# Patient Record
Sex: FEMALE | Race: OTHER
[De-identification: names, ages, dates, MRNs, and addresses within clinical notes are randomized per-mention and may not be internally consistent; named-entity substitution may affect disease eponyms.]

---

## 2017-04-08 ENCOUNTER — HOSPITAL ENCOUNTER (EMERGENCY)
Dept: HOSPITAL 72 - EMR | Age: 32
Discharge: HOME | End: 2017-04-08
Payer: COMMERCIAL

## 2017-04-08 VITALS — SYSTOLIC BLOOD PRESSURE: 112 MMHG | DIASTOLIC BLOOD PRESSURE: 62 MMHG

## 2017-04-08 VITALS — SYSTOLIC BLOOD PRESSURE: 110 MMHG | DIASTOLIC BLOOD PRESSURE: 59 MMHG

## 2017-04-08 VITALS — WEIGHT: 160 LBS | HEIGHT: 63 IN | BODY MASS INDEX: 28.35 KG/M2

## 2017-04-08 DIAGNOSIS — N76.0: ICD-10-CM

## 2017-04-08 DIAGNOSIS — B37.3: ICD-10-CM

## 2017-04-08 DIAGNOSIS — N30.00: Primary | ICD-10-CM

## 2017-04-08 DIAGNOSIS — R31.9: ICD-10-CM

## 2017-04-08 LAB
APPEARANCE UR: (no result)
BACTERIA #/AREA URNS HPF: (no result) /HPF
KETONES UR QL STRIP: NEGATIVE
LEUKOCYTE ESTERASE UR QL STRIP: (no result)
MUCOUS THREADS #/AREA URNS LPF: (no result) /LPF
NITRITE UR QL STRIP: NEGATIVE
PH UR STRIP: 6 [PH] (ref 4.5–8)
PROT UR QL STRIP: (no result)
SP GR UR STRIP: 1.02 (ref 1–1.03)
SQUAMOUS #/AREA URNS LPF: (no result) /LPF
UROBILINOGEN UR-MCNC: 8 MG/DL (ref 0–1)
WBC #/AREA URNS HPF: (no result) /HPF (ref 0–2)
YEAST #/AREA URNS HPF: (no result) /HPF

## 2017-04-08 PROCEDURE — 81003 URINALYSIS AUTO W/O SCOPE: CPT

## 2017-04-08 PROCEDURE — 87210 SMEAR WET MOUNT SALINE/INK: CPT

## 2017-04-08 PROCEDURE — 81025 URINE PREGNANCY TEST: CPT

## 2017-04-08 PROCEDURE — 87181 SC STD AGAR DILUTION PER AGT: CPT

## 2017-04-08 PROCEDURE — 99284 EMERGENCY DEPT VISIT MOD MDM: CPT

## 2017-04-08 PROCEDURE — 87491 CHLMYD TRACH DNA AMP PROBE: CPT

## 2017-04-08 PROCEDURE — 87086 URINE CULTURE/COLONY COUNT: CPT

## 2017-04-08 NOTE — EMERGENCY ROOM REPORT
History of Present Illness


General


Chief Complaint:  Abdominal Pain


Source:  Patient





Present Illness


HPI


Is a 31-year-old female with no past medical history.  She presents with chief 

complaint of dysuria and hematuria.  Onset yesterday.  No nausea no vomiting.  

She does have chronic vaginal discharge for many months.  Pap smear was 

unremarkable.  She had urine testing for gonorrhea Chlamydia and blood testing 

for HIV.  This was done at [facility] 2 months ago and was negative.  

She also said that her discharge is limited more.  She essentially active with 

one partner.  No history of STDs.  No history of ectopic pregnancy.  No other 

complaint.


Allergies:  


Coded Allergies:  


     No Known Allergies (Unverified , 8/19/13)





Patient History


Past Medical History:  see triage record, old chart reviewed


Past Surgical History:  other


Pertinent Family History:  none


Social History:  Denies: smoking


Last Menstrual Period:  march 30,2017


Pregnant Now:  No


Immunizations:  other


Reviewed Nursing Documentation:  PMH: Agreed, PSxH: Agreed





Nursing Documentation-PMH


Past Medical History:  No Stated History


Hx Neurological Problems:  Yes - LEFT WRIST PROBLEM LAST YEAR





Review of Systems


Eye:  Denies: blurred vision, eye pain


ENT:  Denies: ear pain, nose congestion, throat swelling


Respiratory:  Denies: cough, shortness of breath


Cardiovascular:  Denies: chest pain, palpitations


Gastrointestinal:  Denies: abdominal pain, diarrhea, nausea, vomiting


Genitourinary:  Reports: discharge, dysuria, hematuria, urgency


Musculoskeletal:  Denies: back pain, joint pain


Skin:  Denies: rash


Neurological:  Denies: headache, numbness


Endocrine:  Denies: increased thirst, increased urine


Hematologic/Lymphatic:  Denies: easy bruising


All Other Systems:  negative except mentioned in HPI





Physical Exam





Vital Signs








  Date Time  Temp Pulse Resp B/P Pulse Ox O2 Delivery O2 Flow Rate FiO2


 


4/8/17 03:59 98.2 73 18 107/60 99 Room Air  





vitals normal


Sp02 EP Interpretation:  reviewed, normal


General Appearance:  well appearing, no apparent distress, alert


Head:  normocephalic, atraumatic


Eyes:  bilateral eye EOMI, bilateral eye PERRL


ENT:  hearing grossly normal, normal pharynx


Neck:  full range of motion, supple, no meningismus


Respiratory:  chest non-tender, lungs clear, normal breath sounds


Cardiovascular #1:  regular rate, rhythm, no murmur


Gastrointestinal:  normal bowel sounds, non tender, no mass, no organomegaly, 

no bruit, non-distended


Genitourinary:  other - Exam done with RN Tiffany Peters as chaperone.  There 

is thick yellowish discharge.  No cervical motion tenderness.  No adnexal mass.


Musculoskeletal:  back normal, gait/station normal, normal range of motion


Neurologic:  alert, oriented x3


Psychiatric:  mood/affect normal


Skin:  warm/dry





Medical Decision Making


Diagnostic Impression:  


 Primary Impression:  


 UTI (urinary tract infection)


 Qualified Codes:  N30.00 - Acute cystitis without hematuria


 Additional Impressions:  


 Bacterial vaginosis


 Vagina, candidiasis


ER Course


Is present with vaginal discharge and urinary complaint.  Diarrhea and 

Chlamydia cultures sent.  No evidence of Trichomonas.  She she's been tested 

several times already, I will hold off treating gonorrhea and Chlamydia and 

tear we have positive culture.  No evidence of ectopic.  No evidence of 

pyelonephritis.





Last Vital Signs








  Date Time  Temp Pulse Resp B/P Pulse Ox O2 Delivery O2 Flow Rate FiO2


 


4/8/17 04:14 98.2 75 15 110/59 99 Room Air  








Status:  improved


Disposition:  HOME, SELF-CARE


Condition:  Stable


Scripts


Fluconazole (FLUCONAZOLE) 150 Mg Tablet


150 MG ORAL ONCE, #1 TAB


   Prov: ADRIANA FAJARDO M.D.         4/8/17 


Metronidazole* (FLAGYL*) 500 Mg Tablet


500 MG ORAL BID, #14 TAB 0 Refills


   Prov: ADRIANA FAJARDO M.D.         4/8/17 


Cephalexin* (KEFLEX*) 500 Mg Capsule


500 MG ORAL TID, #21 CAP 0 Refills


   Prov: ADRIANA FAJARDO M.D.         4/8/17


Referrals:  


[facility] (PCP)





Additional Instructions:  


Followup with your DrGaby in 3-5 days.  Return if symptom worsen.











ADRIANA FAJARDO M.D. Apr 8, 2017 05:07

## 2017-12-03 ENCOUNTER — HOSPITAL ENCOUNTER (EMERGENCY)
Dept: HOSPITAL 72 - EMR | Age: 32
Discharge: HOME | End: 2017-12-03
Payer: MEDICAID

## 2017-12-03 VITALS — SYSTOLIC BLOOD PRESSURE: 106 MMHG | DIASTOLIC BLOOD PRESSURE: 67 MMHG

## 2017-12-03 VITALS — HEIGHT: 63 IN | BODY MASS INDEX: 29.23 KG/M2 | WEIGHT: 165 LBS

## 2017-12-03 DIAGNOSIS — S39.011A: Primary | ICD-10-CM

## 2017-12-03 DIAGNOSIS — N93.9: ICD-10-CM
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
## 2017-12-03 DIAGNOSIS — N39.0: ICD-10-CM
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
## 2017-12-03 LAB
ALBUMIN/GLOB SERPL: 1 {RATIO} (ref 1–2.7)
ALT SERPL-CCNC: 34 U/L (ref 12–78)
ANION GAP SERPL CALC-SCNC: 7 MMOL/L (ref 5–15)
APPEARANCE UR: (no result)
APTT BLD: 26 SEC (ref 23–33)
AST SERPL-CCNC: 17 U/L (ref 15–37)
BACTERIA #/AREA URNS HPF: (no result) /HPF
BASOPHILS NFR BLD AUTO: 0.7 % (ref 0–2)
CALCIUM SERPL-MCNC: 8.9 MG/DL (ref 8.5–10.1)
CHLORIDE SERPL-SCNC: 104 MMOL/L (ref 98–107)
CO2 SERPL-SCNC: 29 MMOL/L (ref 21–32)
CREAT SERPL-MCNC: 0.9 MG/DL (ref 0.55–1.3)
EOSINOPHIL NFR BLD AUTO: 2.1 % (ref 0–3)
ERYTHROCYTE [DISTWIDTH] IN BLOOD BY AUTOMATED COUNT: 11 % (ref 11.6–14.8)
GFR SERPLBLD BASED ON 1.73 SQ M-ARVRAT: > 60 ML/MIN (ref 60–?)
GLOBULIN SER-MCNC: 3.9 G/DL
INR PPP: 0.9 (ref 0.9–1.1)
KETONES UR QL STRIP: (no result)
LEUKOCYTE ESTERASE UR QL STRIP: (no result)
LIPASE SERPL-CCNC: 113 U/L (ref 73–393)
LYMPHOCYTES NFR BLD AUTO: 24.6 % (ref 20–45)
MCH RBC QN AUTO: 31.5 PG (ref 27–31)
MCHC RBC AUTO-ENTMCNC: 33 G/DL (ref 32–36)
MCV RBC AUTO: 95 FL (ref 80–99)
MONOCYTES NFR BLD AUTO: 6.4 % (ref 1–10)
NEUTROPHILS NFR BLD AUTO: 66.2 % (ref 45–75)
NITRITE UR QL STRIP: NEGATIVE
PH UR STRIP: 5 [PH] (ref 4.5–8)
PLATELET # BLD: 300 K/UL (ref 150–450)
PMV BLD AUTO: 8.8 FL (ref 6.5–10.1)
POTASSIUM SERPL-SCNC: 3.8 MMOL/L (ref 3.5–5.1)
PROT SERPL-MCNC: 7.7 G/DL (ref 6.4–8.2)
PROT UR QL STRIP: (no result)
PROTHROMBIN TIME: 9.8 SEC (ref 9.3–11.5)
RBC # BLD AUTO: 4.43 M/UL (ref 4.2–5.4)
RBC #/AREA URNS HPF: (no result) /HPF (ref 0–2)
SODIUM SERPL-SCNC: 140 MMOL/L (ref 136–145)
SP GR UR STRIP: 1.02 (ref 1–1.03)
SQUAMOUS #/AREA URNS LPF: (no result) /LPF
UROBILINOGEN UR-MCNC: 4 MG/DL (ref 0–1)
WBC # BLD AUTO: 12.6 K/UL (ref 4.8–10.8)
WBC #/AREA URNS HPF: (no result) /HPF (ref 0–2)

## 2017-12-03 PROCEDURE — 85730 THROMBOPLASTIN TIME PARTIAL: CPT

## 2017-12-03 PROCEDURE — 83690 ASSAY OF LIPASE: CPT

## 2017-12-03 PROCEDURE — 36415 COLL VENOUS BLD VENIPUNCTURE: CPT

## 2017-12-03 PROCEDURE — 80053 COMPREHEN METABOLIC PANEL: CPT

## 2017-12-03 PROCEDURE — 76830 TRANSVAGINAL US NON-OB: CPT

## 2017-12-03 PROCEDURE — 85610 PROTHROMBIN TIME: CPT

## 2017-12-03 PROCEDURE — 99284 EMERGENCY DEPT VISIT MOD MDM: CPT

## 2017-12-03 PROCEDURE — 85025 COMPLETE CBC W/AUTO DIFF WBC: CPT

## 2017-12-03 PROCEDURE — 81003 URINALYSIS AUTO W/O SCOPE: CPT

## 2017-12-03 PROCEDURE — 81025 URINE PREGNANCY TEST: CPT

## 2017-12-03 PROCEDURE — 76856 US EXAM PELVIC COMPLETE: CPT
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
## 2017-12-04 NOTE — DIAGNOSTIC IMAGING REPORT
Indication: Vaginal bleeding



Technique: Transvaginal images only. Transabdominal images not obtained, reason 

not

stated



Comparison: 7/8/2016



Findings: Nabothian cysts are seen in the cervix. The uterus measures 8.3 cm in

length by 4.2 cm AP. Endometrium measures 9 mm thick. No myometrial abnormality.

Right ovary measures 2.1 cm in length. It demonstrates a 14 mm follicle with

hemorrhage. The left ovary cannot be visualized. No free cul-de-sac fluid



Impression: Somewhat limited exam, due to absence of transabdominal images



Essentially unremarkable.



Incidental finding of cervical nabothian cysts. Note inability to visualize the 

left

ovary

## 2017-12-21 ENCOUNTER — HOSPITAL ENCOUNTER (EMERGENCY)
Dept: HOSPITAL 72 - EMR | Age: 32
Discharge: HOME | End: 2017-12-21
Payer: MEDICAID

## 2017-12-21 VITALS — WEIGHT: 165 LBS | HEIGHT: 62 IN | BODY MASS INDEX: 30.36 KG/M2

## 2017-12-21 VITALS — SYSTOLIC BLOOD PRESSURE: 118 MMHG | DIASTOLIC BLOOD PRESSURE: 76 MMHG

## 2017-12-21 DIAGNOSIS — J06.9: Primary | ICD-10-CM

## 2017-12-21 LAB
APPEARANCE UR: CLEAR
BACTERIA #/AREA URNS HPF: (no result) /HPF
KETONES UR QL STRIP: NEGATIVE
LEUKOCYTE ESTERASE UR QL STRIP: (no result)
MUCOUS THREADS #/AREA URNS LPF: (no result) /LPF
NITRITE UR QL STRIP: NEGATIVE
PH UR STRIP: 6.5 [PH] (ref 4.5–8)
PROT UR QL STRIP: NEGATIVE
RBC #/AREA URNS HPF: (no result) /HPF (ref 0–2)
SP GR UR STRIP: 1.01 (ref 1–1.03)
SQUAMOUS #/AREA URNS LPF: (no result) /LPF
UROBILINOGEN UR-MCNC: NORMAL MG/DL (ref 0–1)
WBC #/AREA URNS HPF: (no result) /HPF (ref 0–2)

## 2017-12-21 PROCEDURE — 99283 EMERGENCY DEPT VISIT LOW MDM: CPT

## 2017-12-21 PROCEDURE — 86710 INFLUENZA VIRUS ANTIBODY: CPT

## 2017-12-21 PROCEDURE — 94640 AIRWAY INHALATION TREATMENT: CPT

## 2017-12-21 PROCEDURE — 94664 DEMO&/EVAL PT USE INHALER: CPT

## 2017-12-21 PROCEDURE — 71020: CPT

## 2017-12-21 PROCEDURE — 81003 URINALYSIS AUTO W/O SCOPE: CPT

## 2017-12-21 NOTE — EMERGENCY ROOM REPORT
History of Present Illness


General


Chief Complaint:  Flu Like Symptoms


Source:  Patient





Present Illness


HPI


Patient complains of cough for the past 5 days.  She states that the cough is 

pretty severe 5 days ago but it has improved significantly over that time.  

Initially she denies sputum production but no longer has any sputum production.

  She denies nasal congestion or fullness.  She denies headache or neck pain.  

She denies chest pain.  She denies abdominal pain.  She denies fever or chills.

  She has no other complaints.


Allergies:  


Coded Allergies:  


     No Known Allergies (Unverified , 8/19/13)





Patient History


Past Medical History:  none


Past Surgical History:  none


Pertinent Family History:  none


Social History:  Denies: smoking, alcohol use, drug use


Reviewed Nursing Documentation:  PMH: Agreed, PSxH: Agreed





Nursing Documentation-PMH


Hx Neurological Problems:  Yes - LEFT WRIST PROBLEM LAST YEAR





Review of Systems


All Other Systems:  negative except mentioned in HPI





Physical Exam





Vital Signs








  Date Time  Temp Pulse Resp B/P (MAP) Pulse Ox O2 Delivery O2 Flow Rate FiO2


 


12/21/17 09:58 98.1 76 20 101/63 99 Room Air  


 


12/21/17 11:07        21








Sp02 EP Interpretation:  reviewed, normal


General Appearance:  no apparent distress, alert, GCS 15, non-toxic


Head:  normocephalic, atraumatic


Eyes:  bilateral eye normal inspection, bilateral eye PERRL


ENT:  hearing grossly normal, normal pharynx, no angioedema, normal voice


Neck:  full range of motion, supple/symm/no masses


Respiratory:  chest non-tender, lungs clear, normal breath sounds, speaking 

full sentences


Cardiovascular #1:  regular rate, rhythm, no edema


Rectal:  deferred


Musculoskeletal:  back normal, gait/station normal, normal range of motion, non-

tender


Neurologic:  alert, oriented x3, responsive, motor strength/tone normal, 

sensory intact, speech normal


Psychiatric:  judgement/insight normal, memory normal, mood/affect normal, no 

suicidal/homicidal ideation


Skin:  normal color, no rash, warm/dry, well hydrated





Medical Decision Making


Diagnostic Impression:  


 Primary Impression:  


 Upper respiratory infection


ER Course


This patient has a clinical presentation with upper respiratory tract 

infection.  The evaluation was very reassuring with a normal lung exam, no 

respiratory distress, normal pulse oximetry.  Chest x-ray is unremarkable.  I 

am not concerned for pneumonia in this patient.  This is most likely viral and 

will not need antibiotic therapy.  I will treat supportively with cough 

suppressants.  No emergency medical condition was identified.





Laboratory Tests








Test


  12/21/17


10:20


 


Urine Color Yellow  


 


Urine Appearance Clear  


 


Urine pH 6.5 (4.5-8.0)  


 


Urine Specific Gravity


  1.015


(1.005-1.035)


 


Urine Protein


  Negative


(NEGATIVE)


 


Urine Glucose (UA)


  Negative


(NEGATIVE)


 


Urine Ketones


  Negative


(NEGATIVE)


 


Urine Occult Blood


  3+ (NEGATIVE)


H


 


Urine Nitrite


  Negative


(NEGATIVE)


 


Urine Bilirubin


  Negative


(NEGATIVE)


 


Urine Urobilinogen


  Normal MG/DL


(0.0-1.0)


 


Urine Leukocyte Esterase


  3+ (NEGATIVE)


H


 


Urine RBC


  2-4 /HPF (0 -


2)  H


 


Urine WBC


  5-10 /HPF (0 -


2)  H


 


Urine Squamous Epithelial


Cells Few /LPF


(NONE/OCC)


 


Urine Bacteria


  Few /HPF


(NONE)


 


Urine Mucus


  Few /LPF


(NONE/OCC)  H








Microbiology








 Date/Time


Source Procedure


Growth Status


 


 


 12/21/17 10:20


Nasal Nares Influenza Types A,B Antigen (JABIER) - Final Complete








Chest X-Ray Diagnostic Results


Chest X-Ray Diagnostic Results :  


   Chest X-Ray Ordered:  Yes


   # of Views/Limited/Complete:  1 View


   Indication:  Other - cough


   EP Interpretation:  Yes


   Interpretation:  no consolidation, no effusion, no pneumothorax, no acute 

cardiopulmonary disease


   Impression:  No acute disease


   Electronically Signed by:  Janie





Last Vital Signs








  Date Time  Temp Pulse Resp B/P (MAP) Pulse Ox O2 Delivery O2 Flow Rate FiO2


 


12/21/17 11:07        21


 


12/21/17 11:07  75 20   Room Air  


 


12/21/17 09:58 98.1   101/63 99   








Status:  improved


Disposition:  HOME, SELF-CARE


Condition:  Improved


Referrals:  


[facility]ERR[facility] (PCP)











LEO CACERES D.O. Dec 21, 2017 11:30

## 2017-12-21 NOTE — DIAGNOSTIC IMAGING REPORT
Indication: Reason For Exam: COUGH

 

Technique: 2 views of the chest

 

Comparison: 5/4/2016.

 

Findings: Lungs and pleural spaces are clear. Heart size is normal. Bones are

unremarkable..

 

Impression: No acute process

## 2018-07-09 ENCOUNTER — HOSPITAL ENCOUNTER (EMERGENCY)
Dept: HOSPITAL 72 - EMR | Age: 33
Discharge: HOME | End: 2018-07-09
Payer: MEDICAID

## 2018-07-09 VITALS — BODY MASS INDEX: 27.6 KG/M2 | HEIGHT: 62 IN | WEIGHT: 150 LBS

## 2018-07-09 VITALS — SYSTOLIC BLOOD PRESSURE: 106 MMHG | DIASTOLIC BLOOD PRESSURE: 53 MMHG

## 2018-07-09 VITALS — SYSTOLIC BLOOD PRESSURE: 110 MMHG | DIASTOLIC BLOOD PRESSURE: 72 MMHG

## 2018-07-09 DIAGNOSIS — N39.0: Primary | ICD-10-CM

## 2018-07-09 LAB
ADD MANUAL DIFF: NO
ALBUMIN SERPL-MCNC: 3.7 G/DL (ref 3.4–5)
ALBUMIN/GLOB SERPL: 1.1 {RATIO} (ref 1–2.7)
ALP SERPL-CCNC: 59 U/L (ref 46–116)
ALT SERPL-CCNC: 25 U/L (ref 12–78)
ANION GAP SERPL CALC-SCNC: 7 MMOL/L (ref 5–15)
APPEARANCE UR: (no result)
APTT PPP: (no result) S
AST SERPL-CCNC: 13 U/L (ref 15–37)
BASOPHILS NFR BLD AUTO: 0.9 % (ref 0–2)
BILIRUB SERPL-MCNC: 0.4 MG/DL (ref 0.2–1)
BUN SERPL-MCNC: 13 MG/DL (ref 7–18)
CALCIUM SERPL-MCNC: 8.8 MG/DL (ref 8.5–10.1)
CHLORIDE SERPL-SCNC: 106 MMOL/L (ref 98–107)
CO2 SERPL-SCNC: 28 MMOL/L (ref 21–32)
CREAT SERPL-MCNC: 0.8 MG/DL (ref 0.55–1.3)
EOSINOPHIL NFR BLD AUTO: 2.6 % (ref 0–3)
ERYTHROCYTE [DISTWIDTH] IN BLOOD BY AUTOMATED COUNT: 11.1 % (ref 11.6–14.8)
GLOBULIN SER-MCNC: 3.5 G/DL
GLUCOSE UR STRIP-MCNC: NEGATIVE MG/DL
HCT VFR BLD CALC: 37.8 % (ref 37–47)
HGB BLD-MCNC: 12.7 G/DL (ref 12–16)
KETONES UR QL STRIP: NEGATIVE
LEUKOCYTE ESTERASE UR QL STRIP: (no result)
LYMPHOCYTES NFR BLD AUTO: 30.8 % (ref 20–45)
MCV RBC AUTO: 94 FL (ref 80–99)
MONOCYTES NFR BLD AUTO: 6.9 % (ref 1–10)
NEUTROPHILS NFR BLD AUTO: 58.8 % (ref 45–75)
NITRITE UR QL STRIP: NEGATIVE
PH UR STRIP: 7 [PH] (ref 4.5–8)
PLATELET # BLD: 232 K/UL (ref 150–450)
POTASSIUM SERPL-SCNC: 3.6 MMOL/L (ref 3.5–5.1)
PROT UR QL STRIP: (no result)
RBC # BLD AUTO: 4.01 M/UL (ref 4.2–5.4)
SODIUM SERPL-SCNC: 141 MMOL/L (ref 136–145)
SP GR UR STRIP: 1.01 (ref 1–1.03)
UROBILINOGEN UR-MCNC: 4 MG/DL (ref 0–1)
WBC # BLD AUTO: 9.9 K/UL (ref 4.8–10.8)

## 2018-07-09 PROCEDURE — 84702 CHORIONIC GONADOTROPIN TEST: CPT

## 2018-07-09 PROCEDURE — 87086 URINE CULTURE/COLONY COUNT: CPT

## 2018-07-09 PROCEDURE — 99283 EMERGENCY DEPT VISIT LOW MDM: CPT

## 2018-07-09 PROCEDURE — 81003 URINALYSIS AUTO W/O SCOPE: CPT

## 2018-07-09 PROCEDURE — 87181 SC STD AGAR DILUTION PER AGT: CPT

## 2018-07-09 PROCEDURE — 36415 COLL VENOUS BLD VENIPUNCTURE: CPT

## 2018-07-09 PROCEDURE — 83690 ASSAY OF LIPASE: CPT

## 2018-07-09 PROCEDURE — 80053 COMPREHEN METABOLIC PANEL: CPT

## 2018-07-09 PROCEDURE — 81025 URINE PREGNANCY TEST: CPT

## 2018-07-09 PROCEDURE — 85025 COMPLETE CBC W/AUTO DIFF WBC: CPT

## 2018-07-09 NOTE — EMERGENCY ROOM REPORT
History of Present Illness


General


Chief Complaint:  Abdominal Pain


Source:  Patient





Present Illness


HPI


Pt sent from PCP office for elevated BP and concern for pregnanacy. Pt 

complains of lower abd pain, no vaginal d/c or dysuria. Pt states home BP was 

174 systolic. Pt is [id number] .


Allergies:  


Coded Allergies:  


     No Known Allergies (Unverified , 13)





Patient History


Past Medical History:  none


Past Surgical History:  none


Pertinent Family History:  none


Social History:  Denies: smoking, alcohol use, drug use


Last Menstrual Period:  18


:  2


Para:  2





Nursing Documentation-[facility]


Past Medical History:  No Stated History


Hx Neurological Problems:  Yes - LEFT WRIST PROBLEM LAST YEAR





Review of Systems


Constitutional:  Denies: no symptoms, see HPI, chills, sweats, fever, malaise, 

weakness, other


Eye:  Denies: no symptoms, see HPI, eye pain, blurred vision, tearing, double 

vision, nose pain, nose congestion, acuity changes, discharge, other


ENT:  Denies: no symptoms, see HPI, ear pain, ear discharge, nose pain, nose 

congestion, throat pain, throat swelling, mouth pain, hearing loss, nasal 

discharge, other


Respiratory:  Denies: no symptoms, see HPI, cough, orthopnea, shortness of 

breath, stridor, wheezing, PARSON, sputum, other


Cardiovascular:  Denies: no symptoms, see HPI, chest pain, edema, palpitations, 

syncope, PND, other


Gastrointestinal:  Denies: no symptoms, see HPI, abdominal pain, constipation, 

diarrhea, nausea, vomiting, melena, hematemesis, other


Genitourinary:  Reports: see HPI


Musculoskeletal:  Denies: no symptoms, see HPI, back pain, gout, joint pain, 

joint swelling, muscle pain, muscle stiffness, other


Skin:  Denies: no symptoms, see HPI, rash, change in color, change in hair/nails

, dryness, lesions, other


Psychiatric:  Denies: no symptoms, see HPI, prior hx, anxiety, depressed 

feelings, emotional problems, SI, HI, hallucinations, other


Neurological:  Denies: no symptoms, see HPI, headache, numbness, paresthesia, 

seizure, tingling, tremors, focal weakness, syncope, dizziness, other


Hematologic/Lymphatic:  Denies: no symptoms, see HPI, anemia, blood clots, easy 

bleeding, easy bruising, swollen glands, diathesis, other





Physical Exam





Vital Signs








  Date Time  Temp Pulse Resp B/P (MAP) Pulse Ox O2 Delivery O2 Flow Rate FiO2


 


18 14:08 98.3 82 18 106/53 97 Room Air  





 98.2       








Sp02 EP Interpretation:  reviewed, normal


General Appearance:  no apparent distress, alert, GCS 15, non-toxic


Head:  normocephalic, atraumatic


Eyes:  bilateral eye normal inspection, bilateral eye PERRL


ENT:  hearing grossly normal, normal pharynx, no angioedema, normal voice


Neck:  full range of motion, supple/symm/no masses


Respiratory:  chest non-tender, lungs clear, normal breath sounds, speaking 

full sentences


Cardiovascular #1:  regular rate, rhythm, no edema


Cardiovascular #2:  2+ carotid (R), 2+ carotid (L), 2+ radial (R), 2+ radial (L)

, 2+ dorsalis pedis (R), 2+ dorsalis pedis (L)


Gastrointestinal:  normal bowel sounds, non tender, soft, non-distended, no 

guarding, no rebound


Musculoskeletal:  back normal, gait/station normal, normal range of motion, non-

tender, calf tenderness


Neurologic:  alert, oriented x3, responsive, motor strength/tone normal, 

sensory intact, speech normal


Psychiatric:  judgement/insight normal, memory normal, mood/affect normal, no 

suicidal/homicidal ideation


Reflexes:  3+ bicep (R), 3+ bicep (L), 3+ tricep (R), 3+ tricep (L), 3+ knee (R)

, 3+ knee (L)


Skin:  normal color, no rash, warm/dry, well hydrated





Medical Decision Making


Diagnostic Impression:  


 Primary Impression:  


 UTI (urinary tract infection)


ER Course


Pt with lower abd pain and concern for preganancy with HTN. Pts BP has been 

normal here. Labs show ?UTI with dirty sample but will start antibiotics given 

symptoms and pt will call in 48 hours fro culture results.Pt agrees with D/C 

home and plan.





Laboratory Tests








Test


  18


14:40


 


White Blood Count


  9.9 K/UL


(4.8-10.8)


 


Red Blood Count


  4.01 M/UL


(4.20-5.40)  L


 


Hemoglobin


  12.7 G/DL


(12.0-16.0)


 


Hematocrit


  37.8 %


(37.0-47.0)


 


Mean Corpuscular Volume 94 FL (80-99)  


 


Mean Corpuscular Hemoglobin


  31.5 PG


(27.0-31.0)  H


 


Mean Corpuscular Hemoglobin


Concent 33.5 G/DL


(32.0-36.0)


 


Red Cell Distribution Width


  11.1 %


(11.6-14.8)  L


 


Platelet Count


  232 K/UL


(150-450)


 


Mean Platelet Volume


  9.0 FL


(6.5-10.1)


 


Neutrophils (%) (Auto)


  58.8 %


(45.0-75.0)


 


Lymphocytes (%) (Auto)


  30.8 %


(20.0-45.0)


 


Monocytes (%) (Auto)


  6.9 %


(1.0-10.0)


 


Eosinophils (%) (Auto)


  2.6 %


(0.0-3.0)


 


Basophils (%) (Auto)


  0.9 %


(0.0-2.0)


 


Urine Color Yazmin  


 


Urine Appearance


  Slightly


cloudy


 


Urine pH 7 (4.5-8.0)  


 


Urine Specific Gravity


  1.015


(1.005-1.035)


 


Urine Protein


  1+ (NEGATIVE)


H


 


Urine Glucose (UA)


  Negative


(NEGATIVE)


 


Urine Ketones


  Negative


(NEGATIVE)


 


Urine Occult Blood


  2+ (NEGATIVE)


H


 


Urine Nitrite


  Negative


(NEGATIVE)


 


Urine Bilirubin


  Negative


(NEGATIVE)


 


Urine Ictotest Negative  


 


Urine Urobilinogen


  4 MG/DL


(0.0-1.0)  H


 


Urine Leukocyte Esterase


  2+ (NEGATIVE)


H


 


Urine RBC


  60-80 /HPF (0


- 2)  H


 


Urine WBC


  10-15 /HPF (0


- 2)  H


 


Urine Squamous Epithelial


Cells Moderate /LPF


(NONE/OCC)  H


 


Urine Bacteria


  Moderate /HPF


(NONE)  H


 


Urine HCG, Qualitative


  Negative


(NEGATIVE)


 


Sodium Level


  141 MMOL/L


(136-145)


 


Potassium Level


  3.6 MMOL/L


(3.5-5.1)


 


Chloride Level


  106 MMOL/L


()


 


Carbon Dioxide Level


  28 MMOL/L


(21-32)


 


Anion Gap


  7 mmol/L


(5-15)


 


Blood Urea Nitrogen


  13 mg/dL


(7-18)


 


Creatinine


  0.8 MG/DL


(0.55-1.30)


 


Estimat Glomerular Filtration


Rate > 60 mL/min


(>60)


 


Glucose Level


  97 MG/DL


()


 


Calcium Level


  8.8 MG/DL


(8.5-10.1)


 


Total Bilirubin


  0.4 MG/DL


(0.2-1.0)


 


Aspartate Amino Transf


(AST/SGOT) 13 U/L (15-37)


L


 


Alanine Aminotransferase


(ALT/SGPT) 25 U/L (12-78)


 


 


Alkaline Phosphatase


  59 U/L


()


 


Total Protein


  7.2 G/DL


(6.4-8.2)


 


Albumin


  3.7 G/DL


(3.4-5.0)


 


Globulin 3.5 g/dL  


 


Albumin/Globulin Ratio 1.1 (1.0-2.7)  


 


Lipase


  109 U/L


()


 


Human Chorionic Gonadotropin,


Quant 1 mIU/mL (1-6)


 











Last Vital Signs








  Date Time  Temp Pulse Resp B/P (MAP) Pulse Ox O2 Delivery O2 Flow Rate FiO2


 


18 14:08 98.3 82 18 106/53 97 Room Air  





 98.2       








Disposition:  HOME, SELF-CARE


Condition:  Stable


Scripts


Cephalexin* (KEFLEX*) 500 Mg Capsule


500 MG ORAL EVERY 6 HOURS for 7 Days, #21 CAP


   Prov: JOSE CRAIG         18











JOSE CRAIG 2018 14:33

## 2018-09-03 ENCOUNTER — HOSPITAL ENCOUNTER (EMERGENCY)
Dept: HOSPITAL 72 - EMR | Age: 33
Discharge: HOME | End: 2018-09-03
Payer: COMMERCIAL

## 2018-09-03 VITALS — SYSTOLIC BLOOD PRESSURE: 97 MMHG | DIASTOLIC BLOOD PRESSURE: 55 MMHG

## 2018-09-03 VITALS — HEIGHT: 62 IN | WEIGHT: 160 LBS | BODY MASS INDEX: 29.44 KG/M2

## 2018-09-03 DIAGNOSIS — M26.602: Primary | ICD-10-CM

## 2018-09-03 PROCEDURE — 99282 EMERGENCY DEPT VISIT SF MDM: CPT

## 2018-09-03 NOTE — EMERGENCY ROOM REPORT
History of Present Illness


General


Chief Complaint:  Pain


Source:  Patient





Present Illness


HPI


Patient presents with complaints of pain to the left facial region


Essentially points to just anterior to the left year and moving upward towards 

the temporal area


Denies any type pain denies any visual changes


Discomfort started on Friday and has persisted





Denies any ear pain denies any fevers or chills


Denies any recent dental work


Pain is worse with touch and movement


Allergies:  


Coded Allergies:  


     No Known Allergies (Unverified , 8/19/13)





Patient History


Past Medical History:  see triage record


Pertinent Family History:  none


Reviewed Nursing Documentation:  PMH: Agreed; PSxH: Agreed





Nursing Documentation-[facility]


Past Medical History:  No Stated History


Hx Neurological Problems:  Yes - LEFT WRIST PROBLEM LAST YEAR





Review of Systems


All Other Systems:  negative except mentioned in HPI





Physical Exam





Vital Signs








  Date Time  Temp Pulse Resp B/P (MAP) Pulse Ox O2 Delivery O2 Flow Rate FiO2


 


9/3/18 06:49 98.1 72 16 97/55 97 Room Air  





 98.1       








Sp02 EP Interpretation:  reviewed, normal


General Appearance:  well appearing, no apparent distress


Head:  normocephalic, atraumatic, other - Some discomfort reproduced on 

palpation of the region just anterior to the tragus on the left side, also 

discomfort just appear to that


Eyes:  bilateral eye PERRL, bilateral eye EOMI


ENT:  normal pharynx, no angioedema, other - Left back molar lower tooth 

appears to be eroding into the gingival area


Neck:  supple, thyroid normal


Respiratory:  lungs clear, normal breath sounds


Cardiovascular #1:  regular rate, rhythm


Gastrointestinal:  non tender, soft


Musculoskeletal:  normal inspection - Patient able to open and close her mouth, 

it does consult some discomfort to the left TMJ region


Neurologic:  alert, oriented x3, CNs III-XII nml as tested


Skin:  no rash, warm/dry


Lymphatic:  no adenopathy





Medical Decision Making


Diagnostic Impression:  


 Primary Impression:  


 TMJ pain dysfunction syndrome


ER Course


Patient's discomfort is palpated at the TMJ site on the left side, there is 

question of some dental pathology as well, no obvious trismus


Patient does not display other signs of temporal arteritis





At this time will have initial conservative outpatient trial and return with 

any changes


Recommended to follow up with primary physician also dentist in the next 2-3 

days





Last Vital Signs








  Date Time  Temp Pulse Resp B/P (MAP) Pulse Ox O2 Delivery O2 Flow Rate FiO2


 


9/3/18 06:58 98.1  16 97/55 97 Room Air  





 98.1       


 


9/3/18 06:49  72      








Status:  unchanged


Disposition:  HOME, SELF-CARE


Condition:  Stable


Referrals:  


NOT CHOSEN IPA/MD,REFERRING (PCP)





Additional Instructions:  


Patient is provided with the discharge instructions notified to follow up with 

primary doctor in the next 2-3 days otherwise return to the er with any 

worsening symptoms.


Please note that this report is being documented using DRAGON technology.  This 

can lead to erroneous entry secondary to incorrect interpretation by the 

dictating instrument.











Ritchie Eldridge DO Sep 3, 2018 07:08

## 2018-11-30 ENCOUNTER — HOSPITAL ENCOUNTER (EMERGENCY)
Dept: HOSPITAL 72 - EMR | Age: 33
Discharge: HOME | End: 2018-11-30
Payer: COMMERCIAL

## 2018-11-30 VITALS — DIASTOLIC BLOOD PRESSURE: 65 MMHG | SYSTOLIC BLOOD PRESSURE: 112 MMHG

## 2018-11-30 VITALS — BODY MASS INDEX: 29.44 KG/M2 | HEIGHT: 62 IN | WEIGHT: 160 LBS

## 2018-11-30 VITALS — SYSTOLIC BLOOD PRESSURE: 112 MMHG | DIASTOLIC BLOOD PRESSURE: 65 MMHG

## 2018-11-30 VITALS — DIASTOLIC BLOOD PRESSURE: 59 MMHG | SYSTOLIC BLOOD PRESSURE: 110 MMHG
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
## 2018-11-30 DIAGNOSIS — S16.1XXA: Primary | ICD-10-CM

## 2018-11-30 PROCEDURE — 96372 THER/PROPH/DIAG INJ SC/IM: CPT

## 2018-11-30 PROCEDURE — 99283 EMERGENCY DEPT VISIT LOW MDM: CPT

## 2018-11-30 NOTE — EMERGENCY ROOM REPORT
History of Present Illness


General


Chief Complaint:  Neck Pain


Source:  Patient





Present Illness


HPI


32-year-old female presents ED for evaluation.  Complaining of neck pain 5 

days.  Started when she woke up on Sunday.  States that she's been having on 

and off neck problems.  States that her pillows are old.  States that she has 

had a previous next spasm in the past.  Pain is sharp, 10 out of 10, 

nonradiating.  Denies headache, photophobia.  Denies blurry vision.  Denies 

nausea or vomiting.  Denies fevers or chills.  Denies recent injury.  No other 

aggravating relieving factors.  Denies any other associated symptoms


Allergies:  


Coded Allergies:  


     No Known Allergies (Unverified , 8/19/13)





Patient History


Past Medical History:  none


Past Surgical History:  none


Pertinent Family History:  none


Social History:  Denies: smoking, alcohol use, drug use


Last Menstrual Period:  11/28/18


Pregnant Now:  No


Immunizations:  UTD


Reviewed Nursing Documentation:  PMH: Agreed; PSxH: Agreed





Nursing Documentation-PMH


Past Medical History:  No Stated History


Hx Neurological Problems:  Yes - LEFT WRIST PROBLEM LAST YEAR





Review of Systems


All Other Systems:  negative except mentioned in HPI





Physical Exam





Vital Signs








  Date Time  Temp Pulse Resp B/P (MAP) Pulse Ox O2 Delivery O2 Flow Rate FiO2


 


11/30/18 05:26 98.2 70 16 102/65 98 Room Air  








Sp02 EP Interpretation:  reviewed, normal


General Appearance:  no apparent distress, alert, GCS 15, non-toxic


Head:  normocephalic


Eyes:  bilateral eye normal inspection, bilateral eye PERRL


ENT:  hearing grossly normal, normal pharynx, no angioedema, normal voice, TMs 

+ canals normal


Neck:  no meningismus, no bony tend, tender lateral


Respiratory:  normal inspection


Cardiovascular #1:  normal inspection


Gastrointestinal:  normal inspection


Rectal:  deferred


Genitourinary:  no CVA tenderness


Musculoskeletal:  normal inspection


Neurologic:  alert, oriented x3, responsive, CNs III-XII nml as tested, motor 

strength/tone normal, sensory intact, speech normal


Psychiatric:  normal inspection


Skin:  normal inspection


Lymphatic:  normal inspection





Medical Decision Making


Diagnostic Impression:  


 Primary Impression:  


 Cervical strain, acute


 Qualified Codes:  S16.1XXA - Strain of muscle, fascia and tendon at neck level

, initial encounter


 Additional Impression:  


 Neck pain


ER Course


Hospital Course 


32-year-old female presents ED complaining of left-sided neck pain x 5 days 


Differential diagnoses include: neck strain, shoulder strain, dislocation/

fracture





Clinical course


Patient placed on stretcher.  After initial history and physical exam reveals a 

female in mild acute distress.  On exam there is no midline neck tenderness or 

shoulder tenderness.  Full range of motion to the shoulder.  There is pain over 

the trapezius.  Consistent with a neck strain/shoulder strain





I ordered Toradol, Norco, Lidoderm patch, Robaxin in ED.  Upon reassessment 

pain is improved





Discussed findings with patient.  We will discharge with similar regimen of 

pain medications.  Safe for discharge close outpatient follow-up





Diagnosis - cervical strain 





Stable and discharged to home with prescription for treatment motrin, robaxin, 

lidoderm, norco.  Followup with PMD.  Return to ED if symptoms recur or worsen





Last Vital Signs








  Date Time  Temp Pulse Resp B/P (MAP) Pulse Ox O2 Delivery O2 Flow Rate FiO2


 


11/30/18 06:59 98.5 72 15 112/65 100 Room Air  








Status:  improved


Disposition:  HOME, SELF-CARE


Condition:  Stable


Scripts


Lidocaine (Lidoderm) 1 Each Adh..patch


1 PATCH TOPIC DAILY, #7 PATCH 0 Refills


   Patch(es) may remain in place for up to 12 hours in any 24-hour


   period.


   Prov: Mann Hernandez MD         11/30/18 


Methocarbamol* (ROBAXIN-750*) 750 Mg Tablet


750 MG PO TID, #21 TAB 0 Refills


   Prov: Mann Hernandez MD         11/30/18 


Hydrocodone Bit/Acetaminophen 5-325* (NORCO 5-325*) 1 Each Tablet


1 TAB ORAL Q6H PRN for For Pain, #10 TAB 0 Refills


   Prov: Mann Hernandez MD         11/30/18 


Ibuprofen* (MOTRIN*) 600 Mg Tablet


600 MG ORAL Q8H PRN for For Pain, #30 TAB 0 Refills


   Prov: Mann Hernandez MD         11/30/18


Departure Forms:  Return to Work      Return to Work Date:  Dec 3, 2018


   Work Restrictions:  No Heavy Lifting


Patient Instructions:  Cervical Strain and Sprain With [facility]











Mann Hernandez MD Nov 30, 2018 22:18

## 2019-02-16 ENCOUNTER — HOSPITAL ENCOUNTER (EMERGENCY)
Dept: HOSPITAL 72 - EMR | Age: 34
Discharge: HOME | End: 2019-02-16
Payer: COMMERCIAL

## 2019-02-16 VITALS — HEIGHT: 62 IN | BODY MASS INDEX: 30.36 KG/M2 | WEIGHT: 165 LBS

## 2019-02-16 VITALS — DIASTOLIC BLOOD PRESSURE: 72 MMHG | SYSTOLIC BLOOD PRESSURE: 110 MMHG

## 2019-02-16 DIAGNOSIS — K52.9: ICD-10-CM

## 2019-02-16 DIAGNOSIS — N88.8: ICD-10-CM

## 2019-02-16 DIAGNOSIS — O26.891: ICD-10-CM

## 2019-02-16 DIAGNOSIS — O20.0: Primary | ICD-10-CM

## 2019-02-16 DIAGNOSIS — Z3A.12: ICD-10-CM

## 2019-02-16 LAB
ADD MANUAL DIFF: NO
ALBUMIN SERPL-MCNC: 3.3 G/DL (ref 3.4–5)
ALBUMIN/GLOB SERPL: 0.8 {RATIO} (ref 1–2.7)
ALP SERPL-CCNC: 63 U/L (ref 46–116)
ALT SERPL-CCNC: 30 U/L (ref 12–78)
ANION GAP SERPL CALC-SCNC: 9 MMOL/L (ref 5–15)
APPEARANCE UR: CLEAR
APTT PPP: YELLOW S
AST SERPL-CCNC: 17 U/L (ref 15–37)
BASOPHILS NFR BLD AUTO: 0.7 % (ref 0–2)
BILIRUB SERPL-MCNC: 0.4 MG/DL (ref 0.2–1)
BUN SERPL-MCNC: 8 MG/DL (ref 7–18)
CALCIUM SERPL-MCNC: 9.1 MG/DL (ref 8.5–10.1)
CHLORIDE SERPL-SCNC: 101 MMOL/L (ref 98–107)
CO2 SERPL-SCNC: 25 MMOL/L (ref 21–32)
CREAT SERPL-MCNC: 0.6 MG/DL (ref 0.55–1.3)
EOSINOPHIL NFR BLD AUTO: 2.5 % (ref 0–3)
ERYTHROCYTE [DISTWIDTH] IN BLOOD BY AUTOMATED COUNT: 11 % (ref 11.6–14.8)
GLOBULIN SER-MCNC: 4.1 G/DL
GLUCOSE UR STRIP-MCNC: NEGATIVE MG/DL
HCT VFR BLD CALC: 39.8 % (ref 37–47)
HGB BLD-MCNC: 13.8 G/DL (ref 12–16)
KETONES UR QL STRIP: (no result)
LEUKOCYTE ESTERASE UR QL STRIP: (no result)
LYMPHOCYTES NFR BLD AUTO: 24.1 % (ref 20–45)
MCV RBC AUTO: 93 FL (ref 80–99)
MONOCYTES NFR BLD AUTO: 3.9 % (ref 1–10)
NEUTROPHILS NFR BLD AUTO: 68.9 % (ref 45–75)
NITRITE UR QL STRIP: NEGATIVE
PH UR STRIP: 6 [PH] (ref 4.5–8)
PLATELET # BLD: 294 K/UL (ref 150–450)
POTASSIUM SERPL-SCNC: 3.6 MMOL/L (ref 3.5–5.1)
PROT UR QL STRIP: (no result)
RBC # BLD AUTO: 4.27 M/UL (ref 4.2–5.4)
SODIUM SERPL-SCNC: 135 MMOL/L (ref 136–145)
SP GR UR STRIP: 1.02 (ref 1–1.03)
UROBILINOGEN UR-MCNC: 4 MG/DL (ref 0–1)
WBC # BLD AUTO: 11.7 K/UL (ref 4.8–10.8)

## 2019-02-16 PROCEDURE — 83690 ASSAY OF LIPASE: CPT

## 2019-02-16 PROCEDURE — 81025 URINE PREGNANCY TEST: CPT

## 2019-02-16 PROCEDURE — 96374 THER/PROPH/DIAG INJ IV PUSH: CPT

## 2019-02-16 PROCEDURE — 81003 URINALYSIS AUTO W/O SCOPE: CPT

## 2019-02-16 PROCEDURE — 99284 EMERGENCY DEPT VISIT MOD MDM: CPT

## 2019-02-16 PROCEDURE — 96361 HYDRATE IV INFUSION ADD-ON: CPT

## 2019-02-16 PROCEDURE — 80053 COMPREHEN METABOLIC PANEL: CPT

## 2019-02-16 PROCEDURE — 76830 TRANSVAGINAL US NON-OB: CPT

## 2019-02-16 PROCEDURE — 36415 COLL VENOUS BLD VENIPUNCTURE: CPT

## 2019-02-16 PROCEDURE — 84702 CHORIONIC GONADOTROPIN TEST: CPT

## 2019-02-16 PROCEDURE — 76801 OB US < 14 WKS SINGLE FETUS: CPT

## 2019-02-16 PROCEDURE — 85025 COMPLETE CBC W/AUTO DIFF WBC: CPT

## 2019-02-16 PROCEDURE — 96375 TX/PRO/DX INJ NEW DRUG ADDON: CPT

## 2019-02-16 NOTE — DIAGNOSTIC IMAGING REPORT
EXAM:

  US Pregnancy First Trimester, Transabdominal

  US Pregnancy, Transvaginal

 

CLINICAL HISTORY:

  ABD PAIN

 

TECHNIQUE:

  Real-time transabdominal and transvaginal obstetrical ultrasound of the 

maternal pelvis and a first trimester pregnancy with image documentation. 

 Transvaginal imaging was used for better evaluation of the fetus and 

adnexa.

 

COMPARISON:

  No relevant prior studies available.

 

FINDINGS:

  Gestation:  Single, live, intrauterine gestation, with estimated 

gestational age of 11 weeks 3 days and LELA of 09/04/19.  Crown-rump 

length of 44.8 mm.  9 x 8 mm fetal yolk sac.  Fetal heart rate of 153 bpm.

 

  Placenta/amniotic fluid:  Cannot be adequately evaluated due to the 

early gestational age.

  Uterus/cervix:  Septated uterus.  Subcentimeter cervical nabothian 

cysts.  Cervical length of 4.3 cm.  No myometrial mass.

  Ovaries:  The ovaries were not identified by transvaginal sonography.

  Free fluid:  No free fluid.

 

IMPRESSION:     

1.  Single, live, intrauterine gestation, with estimated gestational age 

of 11 weeks 3 days and LELA of 09/04/19.

2.  Septated uterus.

3.  Subcentimeter cervical nabothian cysts.

## 2019-02-16 NOTE — NUR
ED Nurse Note:



PT WALKED IN TO ER TODAY FROM HOME. AOX4. PT C/O ABDOMINAL CRAMPING, 8/10 PAIN, 
NAUSEA, VOMITING AND DIARRHEA X 2 DAYS AGO. ACTIVE BOWEL SOUNDS IN ALL 
QUADRANTS. ABDOMEN NONDISTENDED AND NONTENDER TO PALPATION. LAST BM X THIS AM 
WHICH PT STATES WAS LIQUID. OF NOTE, PT HAS BEEN CONFIRMED 12 WEEKS PREGNANT BY 
OB/GYN.

## 2019-02-16 NOTE — EMERGENCY ROOM REPORT
History of Present Illness


General


Chief Complaint:  Pregnancy Complications


Source:  Patient





Present Illness


HPI


33-year-old female presents ED for evaluation.  Present with abdominal pain 

with vomiting and diarrhea 2 days.  Pain is cramping, 8 out of 10, 

nonradiating.  Denies any recent travel or recent antibiotic use.  Denies sick 

contacts.  States that she is about 12 weeks pregnant.  Denies any spotting or 

bleeding.  Denies fevers or chills.  No other aggravating relieving factors.  

Denies any other associated symptoms


Allergies:  


Coded Allergies:  


     No Known Allergies (Unverified , 13)





Patient History


Past Medical History:  none


Past Surgical History:  none


Pertinent Family History:  none


Social History:  Denies: smoking, alcohol use, drug use


Last Menstrual Period:  2018


Pregnant Now:  Yes


:  3


Para:  2


Immunizations:  UTD


Reviewed Nursing Documentation:  PMH: Agreed; PSxH: Agreed





Nursing Documentation-PM


Past Medical History:  No Stated History


Hx Neurological Problems:  Yes - LEFT WRIST PROBLEM LAST YEAR





Review of Systems


All Other Systems:  negative except mentioned in HPI





Physical Exam





Vital Signs








  Date Time  Temp Pulse Resp B/P (MAP) Pulse Ox O2 Delivery O2 Flow Rate FiO2


 


19 08:51 98.2 92 16 105/66 97 Room Air  








Sp02 EP Interpretation:  reviewed, normal


General Appearance:  no apparent distress, alert, GCS 15, non-toxic


Head:  normocephalic, atraumatic


Eyes:  bilateral eye normal inspection, bilateral eye PERRL


ENT:  hearing grossly normal, normal pharynx, no angioedema, normal voice


Neck:  full range of motion, supple/symm/no masses


Respiratory:  chest non-tender, lungs clear, normal breath sounds, speaking 

full sentences


Cardiovascular #1:  regular rate, rhythm, no edema


Cardiovascular #2:  2+ carotid (R), 2+ carotid (L), 2+ radial (R), 2+ radial (L)

, 2+ dorsalis pedis (R), 2+ dorsalis pedis (L)


Gastrointestinal:  normal bowel sounds, non tender, soft, non-distended, no 

guarding, no rebound


Rectal:  deferred


Genitourinary:  normal inspection, no CVA tenderness


Musculoskeletal:  back normal, gait/station normal, normal range of motion, non-

tender


Neurologic:  alert, oriented x3, responsive, motor strength/tone normal, 

sensory intact, speech normal


Psychiatric:  judgement/insight normal, memory normal, mood/affect normal, no 

suicidal/homicidal ideation


Reflexes:  3+ bicep (R), 3+ bicep (L), 3+ tricep (R), 3+ tricep (L), 3+ knee (R)

, 3+ knee (L)


Skin:  normal color, no rash, warm/dry, well hydrated


Lymphatic:  no adenopathy





Medical Decision Making


Diagnostic Impression:  


 Primary Impression:  


 Threatened 


 Additional Impression:  


 Gastroenteritis


ER Course


Hospital Course 


33-year-old female presents to ED complaining of abdominal pain with vomiting 

and dirrhea, + pregnant





Differential diagnoses include: gastrits, gastroenterits, ectopic pregnancy, 

ovarian torsion/cyst, UTI 





Clinical course


Patient placed on stretcher in ED.  After initial history and physical I 

ordered labs, IV fluids and Zofran, pepcid and pelvic ultrasound.





Labs-no leukocytosis, electrolytes okay, UA negative





OB ultrasound-IUP detected with fetal heart rate





Discussed findings with patient.  Likely gastroenteritis.  Still recommended 

patient follow-up with her OB/GYN with serial ultrasounds.





Patient agrees with plan.  Safe for discharge close outpatient follow-up.  

States she has a PMD





Diagnosis - threatened , gastroenteritis





Stable and discharged to home with Rx zofran, zantac, bentyl.  Followup with PMD

/OB/GYN.  Return to ED if symptoms recur or worsen





Labs








Test


  19


09:11


 


White Blood Count


  11.7 K/UL


(4.8-10.8)


 


Red Blood Count


  4.27 M/UL


(4.20-5.40)


 


Hemoglobin


  13.8 G/DL


(12.0-16.0)


 


Hematocrit


  39.8 %


(37.0-47.0)


 


Mean Corpuscular Volume 93 FL (80-99) 


 


Mean Corpuscular Hemoglobin


  32.4 PG


(27.0-31.0)


 


Mean Corpuscular Hemoglobin


Concent 34.8 G/DL


(32.0-36.0)


 


Red Cell Distribution Width


  11.0 %


(11.6-14.8)


 


Platelet Count


  294 K/UL


(150-450)


 


Mean Platelet Volume


  7.3 FL


(6.5-10.1)


 


Neutrophils (%) (Auto)


  68.9 %


(45.0-75.0)


 


Lymphocytes (%) (Auto)


  24.1 %


(20.0-45.0)


 


Monocytes (%) (Auto)


  3.9 %


(1.0-10.0)


 


Eosinophils (%) (Auto)


  2.5 %


(0.0-3.0)


 


Basophils (%) (Auto)


  0.7 %


(0.0-2.0)


 


Urine Color Yellow 


 


Urine Appearance Clear 


 


Urine pH 6 (4.5-8.0) 


 


Urine Specific Gravity


  1.020


(1.005-1.035)


 


Urine Protein 2+ (NEGATIVE) 


 


Urine Glucose (UA)


  Negative


(NEGATIVE)


 


Urine Ketones 1+ (NEGATIVE) 


 


Urine Blood 4+ (NEGATIVE) 


 


Urine Nitrite


  Negative


(NEGATIVE)


 


Urine Bilirubin


  Negative


(NEGATIVE)


 


Urine Urobilinogen


  4 MG/DL


(0.0-1.0)


 


Urine Leukocyte Esterase 1+ (NEGATIVE) 


 


Urine RBC


  2-4 /HPF (0 -


2)


 


Urine WBC


  2-4 /HPF (0 -


2)


 


Urine Squamous Epithelial


Cells Few /LPF


(NONE/OCC)


 


Urine Bacteria


  Few /HPF


(NONE)


 


Urine Mucus


  Moderate /LPF


(NONE/OCC)


 


Urine HCG, Qualitative


  Positive


(NEGATIVE)


 


Sodium Level


  135 MMOL/L


(136-145)


 


Potassium Level


  3.6 MMOL/L


(3.5-5.1)


 


Chloride Level


  101 MMOL/L


()


 


Carbon Dioxide Level


  25 MMOL/L


(21-32)


 


Anion Gap


  9 mmol/L


(5-15)


 


Blood Urea Nitrogen 8 mg/dL (7-18) 


 


Creatinine


  0.6 MG/DL


(0.55-1.30)


 


Estimat Glomerular Filtration


Rate > 60 mL/min


(>60)


 


Glucose Level


  110 MG/DL


()


 


Calcium Level


  9.1 MG/DL


(8.5-10.1)


 


Total Bilirubin


  0.4 MG/DL


(0.2-1.0)


 


Aspartate Amino Transf


(AST/SGOT) 17 U/L (15-37) 


 


 


Alanine Aminotransferase


(ALT/SGPT) 30 U/L (12-78) 


 


 


Alkaline Phosphatase


  63 U/L


()


 


Total Protein


  7.4 G/DL


(6.4-8.2)


 


Albumin


  3.3 G/DL


(3.4-5.0)


 


Globulin 4.1 g/dL 


 


Albumin/Globulin Ratio 0.8 (1.0-2.7) 


 


Lipase


  117 U/L


()


 


Human Chorionic Gonadotropin,


Quant [id number] mIU/mL


(1-6)








CT/MRI/US Diagnostic Results


CT/MRI/US Diagnostic Results :  


   Imaging Test Ordered:  OB US


   Impression


1.  Single, live, intrauterine gestation, with estimated gestational age 


of 11 weeks 3 days and LELA of 19.


2.  Septated uterus.


3.  Subcentimeter cervical nabothian cysts.





Last Vital Signs








  Date Time  Temp Pulse Resp B/P (MAP) Pulse Ox O2 Delivery O2 Flow Rate FiO2


 


19 10:52 98.4 86 16 110/72 99 Room Air  








Status:  improved


Disposition:  HOME, SELF-CARE


Condition:  Stable


Scripts


Dicyclomine Hcl* (DICYCLOMINE HCL*) 10 Mg Capsule


10 MG PO QID for 5 Days, CAP


   Prov: Mann Hernandez MD         19 


Ranitidine Hcl* (ZANTAC*) 150 Mg Tablet


150 MG ORAL TWICE A DAY, #30 TAB


   Prov: Mann Hernandez MD         19 


Ondansetron Odt* (ZOFRAN ODT*) 4 Mg Tab.rapdis


4 MG BC EVERY 8 HOURS, #10 TAB 0 Refills


   Prov: Mann Hernandez MD         19


Patient Instructions:  Threatened Miscarriage, Easy-to-Read, Viral 

Gastroenteritis, Adult, Easy-to-Read











Mann Hernandez MD 2019 14:03

## 2019-02-16 NOTE — NUR
ED Nurse Note:



PT LAYING PEACEFULLY IN BED IN NAD. AOX4. PRESCRIPTIONS AND DISCHARGE PAPERWORK 
EXPLAINED TO PT. PT VERBALIZES UNDERSTANDING AND ALL QUESTIONS ANSWERED. 
PRESCRIPTIONS AND DISCHARGE PAPERWORK GIVEN TO PT, IV AND ID WRISTBAND REMOVED. 
PT WALKED OUT OF ER WITH STEADY GAIT AND ALL BELONGINGS.

## 2019-09-29 ENCOUNTER — HOSPITAL ENCOUNTER (EMERGENCY)
Dept: HOSPITAL 72 - EMR | Age: 34
Discharge: HOME | End: 2019-09-29
Payer: MEDICAID

## 2019-09-29 VITALS — DIASTOLIC BLOOD PRESSURE: 55 MMHG | SYSTOLIC BLOOD PRESSURE: 104 MMHG

## 2019-09-29 VITALS — BODY MASS INDEX: 31.28 KG/M2 | HEIGHT: 62 IN | WEIGHT: 170 LBS

## 2019-09-29 VITALS — SYSTOLIC BLOOD PRESSURE: 104 MMHG | DIASTOLIC BLOOD PRESSURE: 55 MMHG

## 2019-09-29 DIAGNOSIS — R22.31: Primary | ICD-10-CM

## 2019-09-29 PROCEDURE — 99283 EMERGENCY DEPT VISIT LOW MDM: CPT

## 2019-09-29 PROCEDURE — 73130 X-RAY EXAM OF HAND: CPT

## 2019-09-29 PROCEDURE — 29125 APPL SHORT ARM SPLINT STATIC: CPT

## 2019-09-29 PROCEDURE — 29130 APPL FINGER SPLINT STATIC: CPT

## 2019-09-29 NOTE — NUR
ED Nurse Note:



patient walked into ED from home complaining RIGHT wrist pain and inability to 
move fingers since last night. patient denies any injury or truam. patient is 
alert awake x4 ambulatory steady gait, breathing unlabored and even, speaking 
in full sentences. 

Rt wrist pain unable to move  fingers started last night denies any tauma.

## 2019-09-29 NOTE — NUR
ER DISCHARGE NOTE:

Patient is cleared to be discharged per ERMD DR ZAVALA, pt is aox4, on room 
air, with stable vital signs. pt was given dc and prescription instructions, pt 
was able to verbalize understanding, pt id band removed without complications. 
pt is able to ambulate with steady gait. pt took all belongings.

## 2019-09-29 NOTE — EMERGENCY ROOM REPORT
History of Present Illness


General


Chief Complaint:  Pain


Source:  Patient





Present Illness


HPI


Disclaimer: Please note that this report is being documented using DRAGON 

technology. This can lead to erroneous entry secondary to incorrect 

interpretation by the dictating instrument.





HPI: 33-year-old right-hand-dominant female presents for evaluation of right 

hand pain and swelling.  Symptoms began last night.  She works as a nurse and 

noted some pain in between the middle and ring finger MCPJs which then 

progressed to mild swelling.  Is tender to palpation.  She has difficulty with 

flexion of the ring and middle finger on the right hand.  She cannot recall any 

obvious trauma.  She does not do any heavy lifting at work.  She denies any 

sensory changes, numbness, tingling.  Denies pain in the wrist, swelling in the 

wrist, prior history of cysts.  Denies any recent insect bites, skin breakdown, 

abrasions.  Has not taken any medications prior to arrival.


 


PMH: Denies


 


PSH:  section


 


Allergies: Denies


 


Social Hx: Denies drug or alcohol abuse


Allergies:  


Coded Allergies:  


     No Known Allergies (Unverified , 13)





Patient History


Last Menstrual Period:  


Pregnant Now:  No - gave birth a month ago


:  3


Para:  3





Nursing Documentation-PMH


Past Medical History:  No History, Except For


Hx Cardiac Problems:  No -  2019


Hx Neurological Problems:  Yes - LEFT WRIST PROBLEM LAST YEAR





Review of Systems


All Other Systems:  negative except mentioned in HPI





Physical Exam





Vital Signs








  Date Time  Temp Pulse Resp B/P (MAP) Pulse Ox O2 Delivery O2 Flow Rate FiO2


 


19 08:57 98.4 77 16 104/55 (71) 97 Room Air  





 





General: Awake and alert, no acute distress


HEENT: NC/AT. EOMI. 


Resp: Normal work of breathing.


Skin: Intact.  No abrasions, laceration or rash over the exposed skin


MSK: Normal tone and bulk. Moving all extremities.  There is a soft tissue 

swelling in between the third and fourth MCP J on the right hand over the 

dorsal aspect.  Tender to palpation.  No overlying skin changes.  It is soft.  

Patient is able to flex and extend the digits of the right hand though there is 

limitation to range of motion secondary to pain.  No evidence of injury to the 

digits or dorsal/volar aspect of the hand.  There is no pain or deformity in 

the wrist with full range of motion.  


Neuro: Awake and alert.  Mentating appropriately.  Sensation is intact to two-

point discrimination over the radial and ulnar aspect of the digits.





Procedures


Additional Procedure


Procedure Narrative


Bedside ultrasound of the right hand: No obvious abscess, soft tissue swelling 

noted over the area of tenderness in the right hand.





Medical Decision Making


Diagnostic Impression:  


 Primary Impression:  


 Localized swelling on right hand


ER Course


Is a 33-year-old right-hand-dominant female presenting for evaluation of pain 

and swelling over the dorsal aspect of the right hand that began last night 

without obvious trauma or injury.  Differential includes was not limited to cyst

, cellulitis, tendon injury, occult fracture, cellulitis.  There is no 

indication of infection, no skin breakdown, no erythema, no warmth.  Bedside 

ultrasound does not show an obvious fluid collection.  Obtain an x-ray to rule 

out occult fracture and splint the patient for comfort.  She can follow-up with 

orthopedic surgery.  Will start NSAIDs for pain and swelling.  She is not to 

lift anything with the right hand until symptoms are improved.  Discussed 

reasons to return to the emergency department.  She understands agrees with 

this treatment plan.


Other X-Ray Diagnostic Results


Other X-Ray Diagnostic Results :  


   X-Ray ordered:  R hand


   # of Views/Limited Vs Complete:  3 View


   Indication:  Pain


   Interpretation:  no dislocation, no soft tissue swelling, no fractures, 

nonspecific bowel gas


   Impression:  No acute disease


   Electronically Signed by:  Electronically signed by Dr. Billy Chambers


Reevaluation Time:  09:44





Last Vital Signs








  Date Time  Temp Pulse Resp B/P (MAP) Pulse Ox O2 Delivery O2 Flow Rate FiO2


 


19 08:57 98.4 77 16 104/55 (71) 97 Room Air  








Reevaluation Impression


No obvious fracture or significant soft tissue swelling seen on x-ray.  We will 

put in a volar splint and follow-up with orthopedic surgery and primary care.  

Started on NSAIDs.  Discussed weight limit restrictions and reasons to return 

to the emergency department.  She understands and agrees with this treatment 

plan.  Stable for outpatient follow-up.


Disposition:  HOME, SELF-CARE


Condition:  Stable


Scripts


Ibuprofen* (MOTRIN*) 600 Mg Tablet


600 MG ORAL Q8H PRN for For Pain, #30 TAB 0 Refills


   Prov: Billy Chambers MD         19











Billy Chambers MD Sep 29, 2019 09:25

## 2019-09-29 NOTE — DIAGNOSTIC IMAGING REPORT
EXAM:

  XR Right Hand Complete, 3 or More Views

 

CLINICAL HISTORY:

  PAIN

 

TECHNIQUE:

  Frontal, lateral and oblique views of the right hand.

 

COMPARISON:

  No relevant prior studies available.

 

FINDINGS:

  Bones joints:  Incidental note of endosteal cortical thickening of the 

ulnar aspect of the distal phalanx of the fourth digit.  There is 

narrowing of the medullary canal.  No acute fracture.  No dislocation.  

Joint spaces are well-maintained.

  Soft tissues:  Unremarkable.  No radiopaque foreign body.

 

IMPRESSION:     

1.  No acute fracture or malalignment.  Joint spaces are well-maintained.

2.  Incidental note of endosteal cortical thickening of the ulnar aspect 

of the distal phalanx of the fourth digit.  There is narrowing of the 

medullary canal.  Query old injury.  Correlate with site of pain.